# Patient Record
Sex: MALE | Race: OTHER | NOT HISPANIC OR LATINO | URBAN - METROPOLITAN AREA
[De-identification: names, ages, dates, MRNs, and addresses within clinical notes are randomized per-mention and may not be internally consistent; named-entity substitution may affect disease eponyms.]

---

## 2024-02-22 ENCOUNTER — LAB VISIT (OUTPATIENT)
Dept: LAB | Facility: HOSPITAL | Age: 69
End: 2024-02-22
Attending: PHYSICIAN ASSISTANT
Payer: OTHER GOVERNMENT

## 2024-02-22 ENCOUNTER — OFFICE VISIT (OUTPATIENT)
Dept: PULMONOLOGY | Facility: CLINIC | Age: 69
End: 2024-02-22
Payer: OTHER GOVERNMENT

## 2024-02-22 VITALS
WEIGHT: 205.81 LBS | RESPIRATION RATE: 16 BRPM | BODY MASS INDEX: 29.46 KG/M2 | DIASTOLIC BLOOD PRESSURE: 64 MMHG | SYSTOLIC BLOOD PRESSURE: 110 MMHG | OXYGEN SATURATION: 93 % | HEART RATE: 73 BPM | HEIGHT: 70 IN

## 2024-02-22 DIAGNOSIS — J44.9 COPD, GROUP B, BY GOLD 2017 CLASSIFICATION: Primary | ICD-10-CM

## 2024-02-22 DIAGNOSIS — J44.9 COPD, GROUP B, BY GOLD 2017 CLASSIFICATION: ICD-10-CM

## 2024-02-22 DIAGNOSIS — G47.33 OSA (OBSTRUCTIVE SLEEP APNEA): ICD-10-CM

## 2024-02-22 PROCEDURE — 99999 PR PBB SHADOW E&M-NEW PATIENT-LVL III: CPT | Mod: PBBFAC,,, | Performed by: PHYSICIAN ASSISTANT

## 2024-02-22 PROCEDURE — 36415 COLL VENOUS BLD VENIPUNCTURE: CPT | Performed by: PHYSICIAN ASSISTANT

## 2024-02-22 PROCEDURE — 99204 OFFICE O/P NEW MOD 45 MIN: CPT | Mod: S$PBB,,, | Performed by: PHYSICIAN ASSISTANT

## 2024-02-22 PROCEDURE — 99203 OFFICE O/P NEW LOW 30 MIN: CPT | Mod: PBBFAC | Performed by: PHYSICIAN ASSISTANT

## 2024-02-22 PROCEDURE — 82103 ALPHA-1-ANTITRYPSIN TOTAL: CPT | Mod: 91 | Performed by: PHYSICIAN ASSISTANT

## 2024-02-22 PROCEDURE — 82103 ALPHA-1-ANTITRYPSIN TOTAL: CPT | Performed by: PHYSICIAN ASSISTANT

## 2024-02-22 PROCEDURE — 82785 ASSAY OF IGE: CPT | Performed by: PHYSICIAN ASSISTANT

## 2024-02-22 RX ORDER — ONDANSETRON 4 MG/1
4 TABLET, FILM COATED ORAL EVERY 8 HOURS PRN
COMMUNITY
Start: 2023-12-19

## 2024-02-22 RX ORDER — ACETAMINOPHEN 10 MG/ML
325 INJECTION, SOLUTION INTRAVENOUS
COMMUNITY

## 2024-02-22 NOTE — PROGRESS NOTES
"Subjective:       Patient ID: Osman Valdes is a 68 y.o. male.    Chief Complaint: COPD      2/22/2024  Here for COPD  [facility]    History of 12 foot fall onto concrete 12 years ago, rib fractures left side?, "spot on lung" that needed monitoring  Service work history, [employer], service work on household appliances    Chest CT a couple months ago, unsure of results but has Chest CT scheduled March 15    Quit smoking over 10 years ago after heart attack  Smoked since age 13, average 2 packs a day    Spiriva daily for years, he feels this helps his COPD  Has frequent cough  No hospitalizations for COPD  mrC 0-1  Gets "asthma attacks" with strenuous activity or when expsosed to cold   Uses albuterol inhaler with not much relief, better with albuterol nrb    Wears CPAP for ANTONIA, followed by [facility]  Uses nasal mask  Needs updated supplies, has been using same mask for 6 months  Has beard, does not want to trim or use full face mask      There is no immunization history on file for this patient.   Tobacco Use: Medium Risk (2/22/2024)    Patient History     Smoking Tobacco Use: Former     Smokeless Tobacco Use: Never     Passive Exposure: Not on file      History reviewed. No pertinent past medical history.   Current Outpatient Medications on File Prior to Visit   Medication Sig Dispense Refill    ondansetron (ZOFRAN) 4 MG tablet Take 4 mg by mouth every 8 (eight) hours as needed.      ACETAMINOPHEN 1,000 mg/100 mL (10 mg/mL) Soln 325 mg.      aspirin 81 mg Cap 325 mg.       No current facility-administered medications on file prior to visit.        Review of Systems   Constitutional:  Negative for fever, weight loss, appetite change and weakness.   HENT:  Negative for postnasal drip, rhinorrhea, sinus pressure, trouble swallowing and congestion.    Respiratory:  Positive for cough, dyspnea on extertion and use of rescue inhaler. Negative for sputum production, choking, chest tightness, shortness of breath " "and wheezing.    Cardiovascular:  Negative for chest pain and leg swelling.   Musculoskeletal:  Negative for joint swelling.   Gastrointestinal:  Negative for nausea, vomiting and abdominal pain.   Neurological:  Negative for dizziness, weakness and headaches.   Psychiatric/Behavioral:  Positive for sleep disturbance.    All other systems reviewed and are negative.      Objective:       Vitals:    24 0933   BP: 110/64   Pulse: 73   Resp: 16   SpO2: (!) 93%   Weight: 93.3 kg (205 lb 12.8 oz)   Height: 5' 10" (1.778 m)       Physical Exam   Constitutional: He is oriented to person, place, and time. He appears well-developed and well-nourished. No distress.   HENT:   Head: Normocephalic.   Nose: Nose normal.   Mouth/Throat: Oropharynx is clear and moist.   Cardiovascular: Normal rate and regular rhythm.   Pulmonary/Chest: Effort normal. No respiratory distress. He has no wheezes. He has no rhonchi. He has no rales.   Musculoskeletal:         General: No edema.      Cervical back: Normal range of motion and neck supple.   Neurological: He is alert and oriented to person, place, and time. Gait normal.   Skin: Skin is warm and dry.   Psychiatric: He has a normal mood and affect.   Vitals reviewed.    Personal Diagnostic Review    No image results found.            Assessment/Plan:       Continue spiriva, has refills from PCP  Chamber device given to use with albuterol  Labs today  Bring copy of chest CT report to next visit  PFT, walk, FeNO next visit  Recommend getting new cpap supplies, [facility] will get through [facility]    Problem List Items Addressed This Visit          Pulmonary    COPD, group B, by GOLD 2017 classification - Primary    Relevant Orders    Complete PFT with bronchodilator    Stress test, pulmonary    ALPHA 1 ANTITRYPSIN PHENOTYPE    Alpha-1-Antitrypsin    IGE    Fraction of  Nitric Oxide       Other    ANTONIA (obstructive sleep apnea)     Uses CPAP nightly            Follow up in about 6 weeks (around " 4/4/2024) for labs today; pft, walk, feno.    Discussed diagnosis, its evaluation, treatment and usual course. All questions answered.    Patient verbalized understanding of plan and left in no acute distress    Thank you for the courtesy of participating in the care of this patient    Gunjan Villar PA-C  [facility]    Total time spent in the care of this patient     New  3  [] 30-44 min  4  [x] 45-59 min  5  [] 60-74 min     Established  3  [] 20-29 min  4  [] 30-39 min  5  [] 40-54 min        [x] preparing to see the patient (eg, review of tests)  [x] obtaining and/or reviewing separately obtained history  [x] performing a medically appropriate examination and/or evaluation  [x] counseling and educating the patient/family/caregiver  [x] ordering medications, tests, or procedures  [] referring and communicating with other health care professionals (when not separately reported)  [x] documenting clinical information in the electronic or other health record  [x] independently interpreting results (not separately reported) and communicating results to the  patient/ family/caregiver  [] care coordination (not separately reported)

## 2024-02-23 LAB — IGE SERPL-ACNC: <35 IU/ML (ref 0–100)

## 2024-02-24 LAB — A1AT SERPL-MCNC: 176 MG/DL (ref 100–190)

## 2024-02-28 LAB
A1AT PHENOTYP SERPL-IMP: NORMAL BANDS
A1AT SERPL NEPH-MCNC: 171 MG/DL (ref 100–190)

## 2024-04-08 ENCOUNTER — CLINICAL SUPPORT (OUTPATIENT)
Dept: PULMONOLOGY | Facility: CLINIC | Age: 69
End: 2024-04-08
Payer: OTHER GOVERNMENT

## 2024-04-08 ENCOUNTER — OFFICE VISIT (OUTPATIENT)
Dept: PULMONOLOGY | Facility: CLINIC | Age: 69
End: 2024-04-08
Payer: OTHER GOVERNMENT

## 2024-04-08 VITALS — WEIGHT: 206.56 LBS | BODY MASS INDEX: 29.57 KG/M2 | HEIGHT: 70 IN

## 2024-04-08 VITALS
RESPIRATION RATE: 17 BRPM | BODY MASS INDEX: 29.57 KG/M2 | OXYGEN SATURATION: 96 % | SYSTOLIC BLOOD PRESSURE: 124 MMHG | HEART RATE: 79 BPM | HEIGHT: 70 IN | DIASTOLIC BLOOD PRESSURE: 84 MMHG | WEIGHT: 206.56 LBS

## 2024-04-08 DIAGNOSIS — G47.33 OSA (OBSTRUCTIVE SLEEP APNEA): ICD-10-CM

## 2024-04-08 DIAGNOSIS — J44.9 COPD, GROUP B, BY GOLD 2017 CLASSIFICATION: Primary | ICD-10-CM

## 2024-04-08 DIAGNOSIS — J44.9 COPD, GROUP B, BY GOLD 2017 CLASSIFICATION: ICD-10-CM

## 2024-04-08 LAB
BRPFT: NORMAL
DLCO ADJ PRE: 12.48 ML/(MIN*MMHG)
DLCO SINGLE BREATH LLN: 20.91
DLCO SINGLE BREATH PRE REF: 44.8 %
DLCO SINGLE BREATH REF: 27.84
DLCOC SBVA LLN: 2.74
DLCOC SBVA PRE REF: 69 %
DLCOC SBVA REF: 3.9
DLCOC SINGLE BREATH LLN: 20.91
DLCOC SINGLE BREATH PRE REF: 44.8 %
DLCOC SINGLE BREATH REF: 27.84
DLCOVA LLN: 2.74
DLCOVA PRE REF: 69 %
DLCOVA PRE: 2.69 ML/(MIN*MMHG*L)
DLCOVA REF: 3.9
DLVAADJ PRE: 2.69 ML/(MIN*MMHG*L)
ERV LLN: -16448.9
ERV PRE REF: 126.5 %
ERV REF: 1.1
FEF 25 75 CHG: 33.5 %
FEF 25 75 LLN: 1.14
FEF 25 75 POST REF: 32.3 %
FEF 25 75 PRE REF: 24.2 %
FEF 25 75 REF: 2.55
FET100 CHG: -8.8 %
FEV1 CHG: 7.5 %
FEV1 FVC CHG: 4.5 %
FEV1 FVC LLN: 63
FEV1 FVC POST REF: 72.7 %
FEV1 FVC PRE REF: 69.6 %
FEV1 FVC REF: 76
FEV1 LLN: 2.41
FEV1 POST REF: 75.5 %
FEV1 PRE REF: 70.3 %
FEV1 REF: 3.3
FRCPLETH LLN: 2.69
FRCPLETH PREREF: 121.6 %
FRCPLETH REF: 3.68
FVC CHG: 2.8 %
FVC LLN: 3.24
FVC POST REF: 103.5 %
FVC PRE REF: 100.6 %
FVC REF: 4.34
IVC PRE: 3.24 L
IVC SINGLE BREATH LLN: 3.24
IVC SINGLE BREATH PRE REF: 74.7 %
IVC SINGLE BREATH REF: 4.34
MVV LLN: 110
MVV PRE REF: 69.9 %
MVV REF: 129
PEF CHG: 4 %
PEF LLN: 6.34
PEF POST REF: 79.1 %
PEF PRE REF: 76 %
PEF REF: 8.66
POST FEF 25 75: 0.82 L/S
POST FET 100: 18.14 SEC
POST FEV1 FVC: 55.49 %
POST FEV1: 2.49 L
POST FVC: 4.49 L
POST PEF: 6.85 L/S
PRE DLCO: 12.48 ML/(MIN*MMHG)
PRE ERV: 1.4 L
PRE FEF 25 75: 0.62 L/S
PRE FET 100: 19.89 SEC
PRE FEV1 FVC: 53.09 %
PRE FEV1: 2.32 L
PRE FRC PL: 4.47 L
PRE FVC: 4.37 L
PRE MVV: 90 L/MIN
PRE PEF: 6.58 L/S
PRE RV: 2.64 L
PRE TLC: 7.08 L
RAW LLN: 3.06
RAW PRE REF: 125.9 %
RAW PRE: 3.85 CMH2O*S/L
RAW REF: 3.06
RV LLN: 1.9
RV PRE REF: 102.5 %
RV REF: 2.58
RVTLC LLN: 31
RVTLC PRE REF: 92.9 %
RVTLC PRE: 37.26 %
RVTLC REF: 40
TLC LLN: 5.99
TLC PRE REF: 99.2 %
TLC REF: 7.14
VA PRE: 4.64 L
VA SINGLE BREATH LLN: 6.99
VA SINGLE BREATH PRE REF: 66.4 %
VA SINGLE BREATH REF: 6.99
VC LLN: 3.24
VC PRE REF: 102.3 %
VC PRE: 4.44 L
VC REF: 4.34
VTGRAWPRE: 4.6 L

## 2024-04-08 PROCEDURE — 94726 PLETHYSMOGRAPHY LUNG VOLUMES: CPT | Mod: PBBFAC

## 2024-04-08 PROCEDURE — 94726 PLETHYSMOGRAPHY LUNG VOLUMES: CPT | Mod: 26,S$PBB,, | Performed by: INTERNAL MEDICINE

## 2024-04-08 PROCEDURE — 94060 EVALUATION OF WHEEZING: CPT | Mod: 26,S$PBB,, | Performed by: INTERNAL MEDICINE

## 2024-04-08 PROCEDURE — 99211 OFF/OP EST MAY X REQ PHY/QHP: CPT | Mod: PBBFAC,27,25

## 2024-04-08 PROCEDURE — 95012 NITRIC OXIDE EXP GAS DETER: CPT | Mod: PBBFAC

## 2024-04-08 PROCEDURE — 99214 OFFICE O/P EST MOD 30 MIN: CPT | Mod: PBBFAC,27,25 | Performed by: PHYSICIAN ASSISTANT

## 2024-04-08 PROCEDURE — 94618 PULMONARY STRESS TESTING: CPT | Mod: PBBFAC

## 2024-04-08 PROCEDURE — 94729 DIFFUSING CAPACITY: CPT | Mod: 26,S$PBB,, | Performed by: INTERNAL MEDICINE

## 2024-04-08 PROCEDURE — 99211 OFF/OP EST MAY X REQ PHY/QHP: CPT | Mod: PBBFAC,25

## 2024-04-08 PROCEDURE — 94729 DIFFUSING CAPACITY: CPT | Mod: PBBFAC

## 2024-04-08 PROCEDURE — 99999 PR PBB SHADOW E&M-EST. PATIENT-LVL I: CPT | Mod: PBBFAC,,,

## 2024-04-08 PROCEDURE — 99212 OFFICE O/P EST SF 10 MIN: CPT | Mod: PBBFAC,27

## 2024-04-08 PROCEDURE — 99999PBSHW PR PBB SHADOW TECHNICAL ONLY FILED TO HB: Mod: PBBFAC,,,

## 2024-04-08 PROCEDURE — 99999 PR PBB SHADOW E&M-EST. PATIENT-LVL IV: CPT | Mod: PBBFAC,,, | Performed by: PHYSICIAN ASSISTANT

## 2024-04-08 PROCEDURE — 94060 EVALUATION OF WHEEZING: CPT | Mod: PBBFAC

## 2024-04-08 PROCEDURE — 99999 PR PBB SHADOW E&M-EST. PATIENT-LVL II: CPT | Mod: PBBFAC,,,

## 2024-04-08 PROCEDURE — 99214 OFFICE O/P EST MOD 30 MIN: CPT | Mod: S$PBB,25,, | Performed by: PHYSICIAN ASSISTANT

## 2024-04-08 RX ORDER — UMECLIDINIUM BROMIDE AND VILANTEROL TRIFENATATE 62.5; 25 UG/1; UG/1
1 POWDER RESPIRATORY (INHALATION) DAILY
Qty: 60 EACH | Refills: 2 | Status: SHIPPED | OUTPATIENT
Start: 2024-04-08

## 2024-04-08 NOTE — ASSESSMENT & PLAN NOTE
Quit smoking 2014  Average: 2.0 packs/day for 40.0 years; Total pack years: 80.0   PFT 4/8/24 moderate obstruction  No eosinophilia noted on previous CBCs  Cat score 19  D/c spiriva and start Anoro  Continue albuterol prn  Follow up 6 months

## 2024-04-08 NOTE — PROGRESS NOTES
"Subjective:       Patient ID: Osman Valdes is a 68 y.o. male.    Chief Complaint: COPD      4/8/2024  Here for COPD follow up  History of COPD group B on spiriva  [facility]; [facility] disc to our radiology dept  Completed pft, feno, walk today  No signs of exacerbation today  Stable chronic SONG and chronic cough    COPD Questionnaire  How often do you cough?: Most of the time  How often do you have phlegm (mucus) in your chest?: Some of the time  How often does your chest feel tight?: Almost never  When you walk up a hill or one flight of stairs, how often are you breathless?: Most of the time  How often are you limited doing any activities at home?: Never  How often are you confident leaving the house despite your lung condition?: All of the time  How often do you sleep soundly?: Never  How often do you have energy?: Some of the time  Total score: 19      2/22/24  Here for COPD  [facility]    History of 12 foot fall onto concrete 12 years ago, rib fractures left side?, "spot on lung" that needed monitoring  Service work history, [employer], service work on household appliances    Chest CT a couple months ago, unsure of results but has Chest CT scheduled March 15    Quit smoking over 10 years ago after heart attack  Smoked since age 13, average 2 packs a day    Spiriva daily for years, he feels this helps his COPD  Has frequent cough  No hospitalizations for COPD  mrC 0-1  Gets "asthma attacks" with strenuous activity or when expsosed to cold   Uses albuterol inhaler with not much relief, better with albuterol nrb    Wears CPAP for ANTONIA, [facility]  Uses nasal mask  Needs updated supplies, has been using same mask for 6 months  Has beard, does not want to trim or use full face mask      There is no immunization history on file for this patient.   Tobacco Use: Medium Risk (4/8/2024)    Patient History     Smoking Tobacco Use: Former     Smokeless Tobacco Use: Never     " "Passive Exposure: Not on file      History reviewed. No pertinent past medical history.   Current Outpatient Medications on File Prior to Visit   Medication Sig Dispense Refill    ACETAMINOPHEN 1,000 mg/100 mL (10 mg/mL) Soln 325 mg.      aspirin 81 mg Cap 325 mg.      ondansetron (ZOFRAN) 4 MG tablet Take 4 mg by mouth every 8 (eight) hours as needed.       No current facility-administered medications on file prior to visit.        Review of Systems   Constitutional:  Negative for fever, weight loss, appetite change and weakness.   HENT:  Negative for postnasal drip, rhinorrhea, sinus pressure, trouble swallowing and congestion.    Respiratory:  Positive for cough, dyspnea on extertion and use of rescue inhaler. Negative for sputum production, choking, chest tightness, shortness of breath and wheezing.    Cardiovascular:  Negative for chest pain and leg swelling.   Musculoskeletal:  Negative for joint swelling.   Gastrointestinal:  Negative for nausea, vomiting and abdominal pain.   Neurological:  Negative for dizziness, weakness and headaches.   Psychiatric/Behavioral:  Positive for sleep disturbance.    All other systems reviewed and are negative.      Objective:       Vitals:    04/08/24 1003   BP: 124/84   Pulse: 79   Resp: 17   SpO2: 96%   Weight: 93.7 kg (206 lb 9.1 oz)   Height: 5' 10" (1.778 m)         Physical Exam   Constitutional: He is oriented to person, place, and time. He appears well-developed and well-nourished. No distress.   HENT:   Head: Normocephalic.   Nose: Nose normal.   Mouth/Throat: Oropharynx is clear and moist.   Cardiovascular: Normal rate and regular rhythm.   Pulmonary/Chest: Effort normal. No respiratory distress. He has no wheezes. He has no rhonchi. He has no rales.   Musculoskeletal:         General: No edema.      Cervical back: Normal range of motion and neck supple.   Neurological: He is alert and oriented to person, place, and time. Gait normal.   Skin: Skin is warm and dry. "   Psychiatric: He has a normal mood and affect.   Vitals reviewed.    Personal Diagnostic Review    Phase Oxygen Assessment Supplemental O2 Heart   Rate Blood Pressure Hu Dyspnea Scale Rating   Resting 95 % Room Air 79 bpm 109/72 0   Exercise             Minute             1 94 % Room Air 88 bpm       2 91 % Room Air 96 bpm       3 92 % Room Air 96 bpm       4 92 % Room Air 104 bpm       5 91 % Room Air 105 bpm       6  92 % Room Air 106 bpm 125/78 3   Recovery             Minute             1 93 % Room Air 94 bpm       2 97 % Room Air 80 bpm       3 95 % Room Air 78 bpm       4 96 % Room Air 79 bpm 124/84 1      Six Minute Walk Summary  6MWT Status: completed without stopping  Patient Reported: No complaints             Interpretation:  Did the patient stop or pause?: No  Total Time Walked (Calculated): 360 seconds  Final Partial Lap Distance (feet): 0 feet  Total Distance Meters (Calculated): 365.76 meters  Predicted Distance Meters (Calculated): 535.69 meters  Percentage of Predicted (Calculated): 68.28  Peak VO2 (Calculated): 14.95  Mets: 4.27  Has The Patient Had a Previous Six Minute Walk Test?: No     Previous 6MWT Results  Has The Patient Had a Previous Six Minute Walk Test?: No       PFT:     Spirometry shows moderate obstruction. Lung volume determination is normal. Spirometry remains unimproved following bronchodilator. Airway mechanics are abnormal showing increased airway resistance and decreased conductance. DLCO is moderately decreased.   MVV is mildly decreased.     Clinical Guide to Interpretation or FeNO Levels :     FeNO  (ppb) LOW INTERMEDIATE HIGH   ADULT VALUES < 25 25-50          > 50   Th2-driven Inflammation Unlikely Likely Significant      Patients FeNO level at this visit : __21__ (ppb)     Interpretation of FeNO measurement in adults:  [x] FENO is less than 25 ppb implies non eosinophilic airway inflammation or the absence of airway inflammation.               Comment: Low FENO (<25  ppb) in adult asthmatics with persistent symptoms suggests other etiologies for these symptoms and a lower likelihood of benefit from adding or increasing inhaled glucocorticoids.           Assessment/Plan:     Problem List Items Addressed This Visit          Pulmonary    COPD, group B, by GOLD 2017 classification - Primary     Quit smoking 2014  Average: 2.0 packs/day for 40.0 years; Total pack years: 80.0   PFT 4/8/24 moderate obstruction  No eosinophilia noted on previous CBCs  Cat score 19  D/c spiriva and start Anoro  Continue albuterol prn  Follow up 6 months         Relevant Medications    umeclidinium-vilanteroL (ANORO ELLIPTA) 62.5-25 mcg/actuation DsDv    Other Relevant Orders    X-Ray Chest PA And Lateral       Other    ANTONIA (obstructive sleep apnea)     Uses CPAP nightly  [facility]              Follow up in about 6 months (around 10/8/2024) for COPD follow up.    Discussed diagnosis, its evaluation, treatment and usual course. All questions answered.    Patient verbalized understanding of plan and left in no acute distress    Thank you for the courtesy of participating in the care of this patient    Elizabeth G Blough, PA-C [facility]

## 2024-04-08 NOTE — PROCEDURES
"[facility] - Pulmonary Function  Six Minute Walk     SUMMARY     Ordering Provider: Jian RAMIREZ   Interpreting Provider: Maya JURADO  Performing nurse/tech/RT: LS RRT  Diagnosis: COPD  Height: 5' 10" (177.8 cm)  Weight: 93.7 kg (206 lb 9.1 oz)  BMI (Calculated): 29.6   Patient Race:             Phase Oxygen Assessment Supplemental O2 Heart   Rate Blood Pressure Hu Dyspnea Scale Rating   Resting 95 % Room Air 79 bpm 109/72 0   Exercise        Minute        1 94 % Room Air 88 bpm     2 91 % Room Air 96 bpm     3 92 % Room Air 96 bpm     4 92 % Room Air 104 bpm     5 91 % Room Air 105 bpm     6  92 % Room Air 106 bpm 125/78 3   Recovery        Minute        1 93 % Room Air 94 bpm     2 97 % Room Air 80 bpm     3 95 % Room Air 78 bpm     4 96 % Room Air 79 bpm 124/84 1     Six Minute Walk Summary  6MWT Status: completed without stopping  Patient Reported: No complaints     Interpretation:  Did the patient stop or pause?: No         Total Time Walked (Calculated): 360 seconds  Final Partial Lap Distance (feet): 0 feet  Total Distance Meters (Calculated): 365.76 meters  Predicted Distance Meters (Calculated): 535.69 meters  Percentage of Predicted (Calculated): 68.28  Peak VO2 (Calculated): 14.95  Mets: 4.27  Has The Patient Had a Previous Six Minute Walk Test?: No       Previous 6MWT Results  Has The Patient Had a Previous Six Minute Walk Test?: No           CLINICAL INTERPRETATION:  Six minute walk distance is 365.76m (68.28 % predicted) with very light dyspnea.  During exercise, there was no significant desaturation while breathing room air.  Blood pressure remained stable and Heart rate remained stable with walking.  The patient did not report non-pulmonary symptoms during exercise.  The patient did complete the study, walking 360 seconds of the 360 second test.  Based upon age and body mass index, exercise capacity is less than predicted.      [] Mild exercise-induced hypoxemia described as an arterial oxygen " saturation of 93-95% (or 3-4% less than at rest)  [x]  Moderate exercise-induced hypoxemia as 89-93%  []  Severe exercise induced hypoxemia as < 89% O2 saturation.  Medicare Criteria for oxygen prescription comments:   []  When arterial oxygen saturation is at or below 88% during exercise (severe exercise induced hypoxemia) then the patient falls under Medicare Group 1 criteria for supplemental oxygen

## 2024-04-11 PROCEDURE — 94618 PULMONARY STRESS TESTING: CPT | Mod: 26,S$PBB,, | Performed by: INTERNAL MEDICINE

## 2024-06-12 ENCOUNTER — PATIENT MESSAGE (OUTPATIENT)
Dept: PULMONOLOGY | Facility: CLINIC | Age: 69
End: 2024-06-12
Payer: OTHER GOVERNMENT

## 2025-02-21 ENCOUNTER — OFFICE VISIT (OUTPATIENT)
Dept: PULMONOLOGY | Facility: CLINIC | Age: 70
End: 2025-02-21
Payer: OTHER GOVERNMENT

## 2025-02-21 VITALS
OXYGEN SATURATION: 96 % | RESPIRATION RATE: 18 BRPM | SYSTOLIC BLOOD PRESSURE: 122 MMHG | DIASTOLIC BLOOD PRESSURE: 82 MMHG | BODY MASS INDEX: 28.47 KG/M2 | WEIGHT: 198.88 LBS | HEART RATE: 69 BPM | HEIGHT: 70 IN

## 2025-02-21 DIAGNOSIS — F17.211 CIGARETTE NICOTINE DEPENDENCE IN REMISSION: ICD-10-CM

## 2025-02-21 DIAGNOSIS — G47.33 OSA (OBSTRUCTIVE SLEEP APNEA): ICD-10-CM

## 2025-02-21 DIAGNOSIS — J44.9 COPD, GROUP B, BY GOLD 2017 CLASSIFICATION: Primary | ICD-10-CM

## 2025-02-21 DIAGNOSIS — Z23 NEED FOR VACCINATION: ICD-10-CM

## 2025-02-21 PROCEDURE — 90677 PCV20 VACCINE IM: CPT | Mod: PBBFAC

## 2025-02-21 PROCEDURE — 90472 IMMUNIZATION ADMIN EACH ADD: CPT | Mod: PBBFAC

## 2025-02-21 PROCEDURE — 99999 PR PBB SHADOW E&M-EST. PATIENT-LVL IV: CPT | Mod: PBBFAC,,, | Performed by: PHYSICIAN ASSISTANT

## 2025-02-21 PROCEDURE — 99999PBSHW FLU VACCINE - ADJUVANTED: Mod: PBBFAC,,,

## 2025-02-21 PROCEDURE — 99999PBSHW PR PBB SHADOW TECHNICAL ONLY FILED TO HB: Mod: PBBFAC,,,

## 2025-02-21 PROCEDURE — 99214 OFFICE O/P EST MOD 30 MIN: CPT | Mod: PBBFAC,25 | Performed by: PHYSICIAN ASSISTANT

## 2025-02-21 PROCEDURE — 90471 IMMUNIZATION ADMIN: CPT | Mod: PBBFAC

## 2025-02-21 RX ORDER — FLUTICASONE PROPIONATE 50 MCG
1 SPRAY, SUSPENSION (ML) NASAL DAILY
Qty: 16 G | Refills: 2 | Status: SHIPPED | OUTPATIENT
Start: 2025-02-21

## 2025-02-21 RX ORDER — LEVOCETIRIZINE DIHYDROCHLORIDE 5 MG/1
5 TABLET, FILM COATED ORAL NIGHTLY
Qty: 30 TABLET | Refills: 11 | Status: SHIPPED | OUTPATIENT
Start: 2025-02-21 | End: 2026-02-21

## 2025-02-21 RX ORDER — UMECLIDINIUM BROMIDE AND VILANTEROL TRIFENATATE 62.5; 25 UG/1; UG/1
1 POWDER RESPIRATORY (INHALATION) DAILY
Qty: 60 EACH | Refills: 2 | Status: SHIPPED | OUTPATIENT
Start: 2025-02-21

## 2025-02-21 RX ADMIN — PNEUMOCOCCAL 20-VALENT CONJUGATE VACCINE 0.5 ML
2.2; 2.2; 2.2; 2.2; 2.2; 2.2; 2.2; 2.2; 2.2; 2.2; 2.2; 2.2; 2.2; 2.2; 2.2; 2.2; 4.4; 2.2; 2.2; 2.2 INJECTION, SUSPENSION INTRAMUSCULAR at 03:02

## 2025-02-21 NOTE — ASSESSMENT & PLAN NOTE
Quit smoking 2014  Average: 2.0 packs/day for 40.0 years; Total pack years: 80.0   PFT 4/8/24 moderate obstruction  No eosinophilia noted on previous CBCs  Cat score 20  D/c spiriva and start Anoro  Start Flonase and xyzal   Continue albuterol prn  PCV20 and flu shot today    Will come back to clinic next week for updated lina, 6mwd  High alt testing for upcoming flight

## 2025-02-21 NOTE — PROGRESS NOTES
"Subjective:       Patient ID: Osman Valdes is a 69 y.o. male.    Chief Complaint: COPD      2/21/2025  Established patient here for COPD follow up  Recommended Anoro last visit; he says he never got it; unsure what happened  He wants to try again and will check with pharmacy when sent today  Has chronic cough, sputum production, post nasal drip  Denies SOB, chest pain, or wheezing  CHF exacerbation 8/2024  Denies COPD exacerbations    COPD Questionnaire  How often do you cough?: All of the time  How often do you have phlegm (mucus) in your chest?: All of the time  How often does your chest feel tight?: Never  When you walk up a hill or one flight of stairs, how often are you breathless?: Most of the time  How often are you limited doing any activities at home?: Almost never  How often are you confident leaving the house despite your lung condition?: All of the time  How often do you sleep soundly?: Some of the time  How often do you have energy?: A little of the time  Total score: 20      4/8/24  Here for COPD follow up  History of COPD group B on spiriva  [facility]; [facility] disc to [facility] radiology dept  Completed pft, feno, walk today  No signs of exacerbation today  Stable chronic SONG and chronic cough    2/22/24  Here for COPD  [facility]    History of 12 foot fall onto concrete 12 years ago, rib fractures left side?, "spot on lung" that needed monitoring  Service work history, [employer], service work on household appliances    Chest CT a couple months ago, unsure of results but has Chest CT scheduled March 15    Quit smoking over 10 years ago after heart attack  Smoked since age 13, average 2 packs a day    Spiriva daily for years, he feels this helps his COPD  Has frequent cough  No hospitalizations for COPD  mrC 0-1  Gets "asthma attacks" with strenuous activity or when expsosed to cold   Uses albuterol inhaler with not much relief, better with albuterol nrb    Wears " "CPAP for ANTONIA, [facility]  Uses nasal mask  Needs updated supplies, has been using same mask for 6 months  Has beard, does not want to trim or use full face mask    There is no immunization history for the selected administration types on file for this patient.   Tobacco Use: Medium Risk (2/21/2025)    Patient History     Smoking Tobacco Use: Former     Smokeless Tobacco Use: Never     Passive Exposure: Not on file      History reviewed. No pertinent past medical history.   Current Outpatient Medications on File Prior to Visit   Medication Sig Dispense Refill    ACETAMINOPHEN 1,000 mg/100 mL (10 mg/mL) Soln 325 mg.      aspirin 81 mg Cap 325 mg.      ondansetron (ZOFRAN) 4 MG tablet Take 4 mg by mouth every 8 (eight) hours as needed.      [DISCONTINUED] umeclidinium-vilanteroL (ANORO ELLIPTA) 62.5-25 mcg/actuation DsDv Inhale 1 puff into the lungs once daily. Controller 60 each 2     No current facility-administered medications on file prior to visit.        Review of Systems   Constitutional:  Negative for fever, weight loss, appetite change and weakness.   HENT:  Negative for postnasal drip, rhinorrhea, sinus pressure, trouble swallowing and congestion.    Respiratory:  Positive for cough, dyspnea on extertion and use of rescue inhaler. Negative for sputum production, choking, chest tightness, shortness of breath and wheezing.    Cardiovascular:  Negative for chest pain and leg swelling.   Musculoskeletal:  Negative for joint swelling.   Gastrointestinal:  Negative for nausea, vomiting and abdominal pain.   Neurological:  Negative for dizziness, weakness and headaches.   All other systems reviewed and are negative.      Objective:       Vitals:    02/21/25 1450   BP: 122/82   Patient Position: Sitting   Pulse: 69   Resp: 18   SpO2: 96%   Weight: 90.2 kg (198 lb 13.7 oz)   Height: 5' 10" (1.778 m)           Physical Exam   Constitutional: He is oriented to person, place, and time. He appears well-developed and " well-nourished. No distress.   HENT:   Head: Normocephalic.   Nose: Nose normal.   Mouth/Throat: Oropharynx is clear and moist.   Cardiovascular: Normal rate and regular rhythm.   Pulmonary/Chest: Effort normal. No respiratory distress. He has no wheezes. He has no rhonchi. He has no rales.   Musculoskeletal:         General: No edema.      Cervical back: Normal range of motion and neck supple.   Neurological: He is alert and oriented to person, place, and time. Gait normal.   Skin: Skin is warm and dry.   Psychiatric: He has a normal mood and affect.   Vitals reviewed.    Personal Diagnostic Review    Phase Oxygen Assessment Supplemental O2 Heart   Rate Blood Pressure Hu Dyspnea Scale Rating   Resting 95 % Room Air 79 bpm 109/72 0   Exercise             Minute             1 94 % Room Air 88 bpm       2 91 % Room Air 96 bpm       3 92 % Room Air 96 bpm       4 92 % Room Air 104 bpm       5 91 % Room Air 105 bpm       6  92 % Room Air 106 bpm 125/78 3   Recovery             Minute             1 93 % Room Air 94 bpm       2 97 % Room Air 80 bpm       3 95 % Room Air 78 bpm       4 96 % Room Air 79 bpm 124/84 1      Six Minute Walk Summary  6MWT Status: completed without stopping  Patient Reported: No complaints             Interpretation:  Did the patient stop or pause?: No  Total Time Walked (Calculated): 360 seconds  Final Partial Lap Distance (feet): 0 feet  Total Distance Meters (Calculated): 365.76 meters  Predicted Distance Meters (Calculated): 535.69 meters  Percentage of Predicted (Calculated): 68.28  Peak VO2 (Calculated): 14.95  Mets: 4.27  Has The Patient Had a Previous Six Minute Walk Test?: No     Previous 6MWT Results  Has The Patient Had a Previous Six Minute Walk Test?: No       PFT:     Spirometry shows moderate obstruction. Lung volume determination is normal. Spirometry remains unimproved following bronchodilator. Airway mechanics are abnormal showing increased airway resistance and decreased  conductance. DLCO is moderately decreased.   MVV is mildly decreased.     Clinical Guide to Interpretation or FeNO Levels :     FeNO  (ppb) LOW INTERMEDIATE HIGH   ADULT VALUES < 25 25-50          > 50   Th2-driven Inflammation Unlikely Likely Significant      Patients FeNO level at this visit : __21__ (ppb)     Interpretation of FeNO measurement in adults:  [x] FENO is less than 25 ppb implies non eosinophilic airway inflammation or the absence of airway inflammation.               Comment: Low FENO (<25 ppb) in adult asthmatics with persistent symptoms suggests other etiologies for these symptoms and a lower likelihood of benefit from adding or increasing inhaled glucocorticoids.           Assessment/Plan:     Problem List Items Addressed This Visit          Pulmonary    COPD, group B, by GOLD 2017 classification - Primary    Quit smoking 2014  Average: 2.0 packs/day for 40.0 years; Total pack years: 80.0   PFT 4/8/24 moderate obstruction  No eosinophilia noted on previous CBCs  Cat score 20  D/c spiriva and start Anoro  Start Flonase and xyzal   Continue albuterol prn  PCV20 and flu shot today    Will come back to clinic next week for updated lina, 6mwd  High alt testing for upcoming flight         Relevant Medications    umeclidinium-vilanteroL (ANORO ELLIPTA) 62.5-25 mcg/actuation DsDv    levocetirizine (XYZAL) 5 MG tablet    fluticasone propionate (FLONASE) 50 mcg/actuation nasal spray    pneumoc 20-duc conj-dip cr(PF) (PREVNAR-20 (PF)) injection Syrg 0.5 mL    Other Relevant Orders    HIGH ALTITUDE SIMULATION STUDY    Stress test, pulmonary    Spirometry without Bronchodilator       Other    ANTONIA (obstructive sleep apnea)    Uses CPAP nightly  [facility]         Cigarette nicotine dependence in remission    [facility]  He says he will get with his PCP and get this scheduled               Discussed diagnosis, its evaluation, treatment and usual course. All questions answered.    Patient  verbalized understanding of plan and left in no acute distress    Thank you for the courtesy of participating in the care of this patient    Elizabeth G Blough, PA-C [facility]

## 2025-02-24 ENCOUNTER — CLINICAL SUPPORT (OUTPATIENT)
Dept: PULMONOLOGY | Facility: CLINIC | Age: 70
End: 2025-02-24
Payer: OTHER GOVERNMENT

## 2025-02-24 VITALS — HEIGHT: 70 IN | BODY MASS INDEX: 28.18 KG/M2 | WEIGHT: 196.88 LBS

## 2025-02-24 DIAGNOSIS — J44.9 COPD, GROUP B, BY GOLD 2017 CLASSIFICATION: ICD-10-CM

## 2025-02-24 LAB
BRPFT: ABNORMAL
BRPFT: ABNORMAL
FEF 25 75 LLN: 1.51
FEF 25 75 PRE REF: 24.8 %
FEF 25 75 REF: 3.23
FEV1 FVC LLN: 63
FEV1 FVC PRE REF: 74.6 %
FEV1 FVC REF: 76
FEV1 LLN: 2.37
FEV1 PRE REF: 74.7 %
FEV1 REF: 3.26
FVC LLN: 3.2
FVC PRE REF: 99.8 %
FVC REF: 4.3
PEF LLN: 6.24
PEF PRE REF: 104.3 %
PEF REF: 8.55
PRE FEF 25 75: 0.8 L/S (ref 1.51–4.94)
PRE FET 100: 14.8 SEC
PRE FEV1 FVC: 56.8 % (ref 62.94–87.83)
PRE FEV1: 2.44 L (ref 2.37–4.09)
PRE FVC: 4.29 L (ref 3.2–5.41)
PRE PEF: 8.92 L/S (ref 6.24–10.87)

## 2025-02-24 PROCEDURE — 99999 PR PBB SHADOW E&M-EST. PATIENT-LVL I: CPT | Mod: PBBFAC,,,

## 2025-02-24 PROCEDURE — 99211 OFF/OP EST MAY X REQ PHY/QHP: CPT | Mod: PBBFAC,27,25

## 2025-02-24 PROCEDURE — 99999 PR PBB SHADOW E&M-EST. PATIENT-LVL II: CPT | Mod: PBBFAC,,,

## 2025-02-24 PROCEDURE — 99212 OFFICE O/P EST SF 10 MIN: CPT | Mod: PBBFAC

## 2025-02-24 PROCEDURE — 94010 BREATHING CAPACITY TEST: CPT | Mod: PBBFAC

## 2025-02-24 PROCEDURE — 94618 PULMONARY STRESS TESTING: CPT | Mod: PBBFAC

## 2025-02-24 NOTE — PROCEDURES
"[facility] - Pulmonary Function  Six Minute Walk     SUMMARY     Ordering Provider: Jian CLEANING   Interpreting Provider: Dr. Jensen  Performing nurse/tech/RT: CHRISTEN Cortes, RRT  Diagnosis: COPD  Height: 5' 10" (177.8 cm)  Weight: 89.3 kg (196 lb 13.9 oz)  BMI (Calculated): 28.2                Phase Oxygen Assessment Supplemental O2 Heart   Rate Blood Pressure Hu Dyspnea Scale Rating   Resting 94 % Room Air 71 bpm 102/62 1   Exercise        Minute        1 91 % Room Air 86 bpm     2 88 % Room Air 96 bpm     3 92 % 2 L/M 96 bpm     4 91 % 2 L/M 103 bpm     5 90 % 2 L/M 105 bpm     6  90 % 2 L/M 107 bpm 125/70 4   Recovery        Minute        1 97 % 2 L/M 87 bpm     2 98 % 2 L/M 87 bpm     3 98 % 2 L/M 77 bpm     4 98 % 2 L/M 77 bpm 117/65 0     Six Minute Walk Summary  6MWT Status: completed without stopping  Patient Reported: Dyspnea, Dizziness     Interpretation:  Did the patient stop or pause?: No                                         Total Time Walked (Calculated): 360 seconds  Final Partial Lap Distance (feet): 100 feet  Total Distance Meters (Calculated): 457.2 meters  Predicted Distance Meters (Calculated): 538.42 meters  Percentage of Predicted (Calculated): 84.92  Peak VO2 (Calculated): 17.7  Mets: 5.06  Has The Patient Had a Previous Six Minute Walk Test?: Yes       Previous 6MWT Results  Has The Patient Had a Previous Six Minute Walk Test?: Yes  Date of Previous Test: 04/08/24  Total Time Walked: 360 seconds  Total Distance (meters): 365.76  Predicted Distance (meters): 535.69 meters  Percentage of Predicted: 68.28  Percent Change (Calculated): -0.25         CLINICAL INTERPRETATION:  Six minute walk distance is 457 m (84 % predicted) with light dyspnea.  During exercise, there was significant desaturation while breathing room air.  This may represent a normal cardiovascular response to exercise.  The patient did not report non-pulmonary symptoms during exercise.  Dizziness  The patient did complete the " study, walking 360 seconds of the 360 second test.  The patient may benefit from using supplemental oxygen during exertion.  Since the previous study in 04/08/2024, exercise capacity may be somewhat improved.  Based upon age and body mass index, exercise capacity is normal.    Summary   This is an abnormal study, in terms of patient's exercise capacity when compared to people his age gender and height he is able to covered was expected and therefore has no exercise intolerance however the patient exhibited severe exercise-induced hypoxemia with correction upon application of 2 L supplemental oxygen.    [x]  Severe exercise induced hypoxemia as < 89% O2 saturation.  Medicare Criteria for oxygen prescription comments:   [x]  When arterial oxygen saturation is at or below 88% during exercise (severe exercise induced hypoxemia) then the patient falls under Medicare Group 1 criteria for supplemental oxygen

## 2025-03-28 ENCOUNTER — TELEPHONE (OUTPATIENT)
Dept: PULMONOLOGY | Facility: CLINIC | Age: 70
End: 2025-03-28
Payer: OTHER GOVERNMENT

## 2025-03-28 NOTE — TELEPHONE ENCOUNTER
Called patient. Unable to leave vm.----- Message from Gunjan Villar PA-C sent at 3/28/2025 10:43 AM CDT -----  Noticed patient missed follow up with Dr. Trujillo. Qualified for oxygen on his walk testing. Can order supplemental o2 or schedule visit to discuss results.